# Patient Record
(demographics unavailable — no encounter records)

---

## 2018-11-04 NOTE — RAD
CHEST 2 VIEWS:

 

History 

Cough.

 

FINDINGS: 

Films are of suboptimal inspiration.  Cardiothymic silhouette is felt to be within normal limits cons
idering the technique.  No focal infiltrative process.

 

IMPRESSION: 

No focal infiltrates.  Suboptimal inspiration.

 

POS: BOB